# Patient Record
Sex: MALE | Race: WHITE | HISPANIC OR LATINO | Employment: OTHER | URBAN - METROPOLITAN AREA
[De-identification: names, ages, dates, MRNs, and addresses within clinical notes are randomized per-mention and may not be internally consistent; named-entity substitution may affect disease eponyms.]

---

## 2017-02-20 ENCOUNTER — HOSPITAL ENCOUNTER (EMERGENCY)
Facility: HOSPITAL | Age: 32
Discharge: HOME | End: 2017-02-20
Attending: EMERGENCY MEDICINE

## 2017-02-20 VITALS
TEMPERATURE: 99 F | RESPIRATION RATE: 16 BRPM | BODY MASS INDEX: 23.54 KG/M2 | HEIGHT: 67 IN | WEIGHT: 150 LBS | HEART RATE: 101 BPM | DIASTOLIC BLOOD PRESSURE: 78 MMHG | SYSTOLIC BLOOD PRESSURE: 122 MMHG | OXYGEN SATURATION: 98 %

## 2017-02-20 DIAGNOSIS — F41.9 ANXIETY: ICD-10-CM

## 2017-02-20 DIAGNOSIS — R07.89 ATYPICAL CHEST PAIN: ICD-10-CM

## 2017-02-20 DIAGNOSIS — F10.929 ALCOHOL INTOXICATION, WITH UNSPECIFIED COMPLICATION: Primary | ICD-10-CM

## 2017-02-20 PROCEDURE — 93005 ELECTROCARDIOGRAM TRACING: CPT

## 2017-02-20 PROCEDURE — 93010 ELECTROCARDIOGRAM REPORT: CPT | Mod: ,,, | Performed by: INTERNAL MEDICINE

## 2017-02-20 PROCEDURE — 99284 EMERGENCY DEPT VISIT MOD MDM: CPT

## 2017-02-20 NOTE — ED AVS SNAPSHOT
[facility]  [address]  [place] [address]               Babatunde Schmidt   2017  8:51 PM   ED    Descripción:  Male : 1985   Departamento:  [facility]           Hough Cuidado fue coordinado por:     Provider Role From To    Adrian Mehta MD Attending Provider 17 --      Razón de la mayo     Altered Mental Status           Diagnósticos de Esta Visita        Comentarios    Alcohol intoxication, with unspecified complication    -  Primario     Atypical chest pain         Anxiety           ED Disposition     Ninguna           Lista de tareas           Información de seguimiento     Realice un seguimiento con:  [facility] - FAMILY MEDICINE    Cómo:  Nile alcides mayo lo antes posible    Especialidad:  Family Medicine    Por qué:  when you get out of intermediate.     Información de contacto:    [address]  [place] [address]  [phone]        [facility] [place]     [facility] -  Assistance  Registered nurses in the [facility] provide clinical advisement, health education, appointment booking, and other advisory services.  Call for this free service at [phone].             Medicamentos           Mensaje sobre Medicamentos     Verify the changes and/or additions to your medication regime listed below are the same as discussed with your clinician today.  If any of these changes or additions are incorrect, please notify your healthcare provider.             Verifique que la siguiente lista de medicamentos es alcides representación exacta de los medicamentos que está tomando actualmente. Si no hay ningunos reportados, la lista puede estar en gomes. Si no es correcta, por favor póngase en contacto con hough proveedor de atención médica. Lleve esta lista con usted en guanakito de emergencia.                Información de referencia clínica           Ellie signos vitales rené     PS Pulso  "Temperatura Resp Center Peso    131/66 (BP Location: Left arm, Patient Position: Lying, BP Method: Automatic) 102 98.8 °F (37.1 °C) 20 5' 7" (1.702 m) 68 kg (150 lb)    SpO2 BMI (IMC)                96% 23.49 kg/m2          Alergias     A partir del:  2/20/2017        No Known Allergies      Vacunas     Administradas en la fecha de la visita:  2/20/2017        None      ED Micro, Lab, POCT     None      ED Imaging Orders     None      Referencias/Adjuntos de sagar     ALCOHOL ABUSE (Dutch)    CHEST PAIN, UNCERTAIN CAUSE (Dutch)    ANXIETY REACTION (Dutch)      Registrarse para [facility]     Activating your [facility] account is as easy as 1-2-3!     1) Visit [email or web address], select Sign Up Now, enter this activation code and your date of birth, then select Next.  [email or web address]  Expires: 4/6/2017  9:15 PM      2) Create a username and password to use when you visit [facility] in the future and select a security question in case you lose your password and select Next.    3) Enter your e-mail address and click Sign Up!    Additional Information  If you have questions, please e-mail [email or web address] or call [phone] to talk to our [facility] staff. Remember, [facility] is NOT to be used for urgent needs. For medical emergencies, dial 911.         Smoking Cessation     If you would like to quit smoking:   You may be eligible for free services if you are a [place] resident and started smoking cigarettes before September 1, 1988.  Call the Smoking Cessation Trust (SCT) toll free at (241) 804-8712 or (082) 045-7488.   Call 1-935-QUIT-NOW if you do not meet the above criteria.             [facility] complies with applicable Federal civil rights laws and does not discriminate on the basis of race, color, national origin, age, disability, or sex.        Language Assistance Services     ATTENTION: Language assistance services are available, free of charge. Please call [phone].  "     ATENCIÓN: Si habla grupo, tiene a conley disposición servicios gratuitos de asistencia lingüística. Kevin garcia [phone].     [facility] Ý: N?u b?n nói Ti?ng Vi?t, có các d?ch v? h? tr? ngôn ng? mi?n phí dành cho b?n. G?i s? [phone].                    [facility]  [address] [place] [address]               Babatunde Schmidt   2017  8:51 PM   ED    Description:  Male : 1985   Department:  [facility]           Your Care was Coordinated By:     Provider Role From To    Adrian Mehta MD Attending Provider 17 --      Reason for Visit     Altered Mental Status           Diagnoses this Visit        Comments    Alcohol intoxication, with unspecified complication    -  Primary     Atypical chest pain         Anxiety           ED Disposition     None           To Do List           Follow-up Information     Schedule an appointment as soon as possible for a visit with [facility] - FAMILY MEDICINE.    Specialty:  Family Medicine    Why:  when you get out of snf.     Contact information:    [address] [id number] [phone]        [facility]     [facility] -  Assistance  Registered nurses in the [facility] provide clinical advisement, health education, appointment booking, and other advisory services.  Call for this free service at [phone].             Medications           Message regarding Medications     Verify the changes and/or additions to your medication regime listed below are the same as discussed with your clinician today.  If any of these changes or additions are incorrect, please notify your healthcare provider.             Verify that the below list of medications is an accurate representation of the medications you are currently taking.  If none reported, the list may be blank. If incorrect, please contact your healthcare provider. Carry this  "list with you in case of emergency.                Clinical Reference Information           Your Vitals Were     BP Pulse Temp Resp Height Weight    131/66 (BP Location: Left arm, Patient Position: Lying, BP Method: Automatic) 102 98.8 °F (37.1 °C) 20 5' 7" (1.702 m) 68 kg (150 lb)    SpO2 BMI                96% 23.49 kg/m2          Allergies as of 2/20/2017     No Known Allergies      Immunizations Administered on Date of Encounter - 2/20/2017     None      ED Micro, Lab, POCT     None      ED Imaging Orders     None      Discharge References/Attachments     ALCOHOL ABUSE (Japanese)    CHEST PAIN, UNCERTAIN CAUSE (Japanese)    ANXIETY REACTION (Japanese)      [facility] Sign-Up     Activating your [facility] account is as easy as 1-2-3!     1) Visit [email or web address], select Sign Up Now, enter this activation code and your date of birth, then select Next.  [email or web address]  Expires: 4/6/2017  9:15 PM      2) Create a username and password to use when you visit [facility] in the future and select a security question in case you lose your password and select Next.    3) Enter your e-mail address and click Sign Up!    Additional Information  If you have questions, please e-mail [email or web address] or call [phone] to talk to our [facility] staff. Remember, [facility] is NOT to be used for urgent needs. For medical emergencies, dial 911.         Smoking Cessation     If you would like to quit smoking:   You may be eligible for free services if you are a [place] resident and started smoking cigarettes before September 1, 1988.  Call the Smoking Cessation Trust (SCT) toll free at (660) 110-2812 or (242) 538-7800.   Call 0-431-QUIT-NOW if you do not meet the above criteria.             [facility] complies with applicable Federal civil rights laws and does not discriminate on the basis of race, color, national origin, age, disability, or sex.        Language Assistance Services     ATTENTION: Language " assistance services are available, free of charge. Please call [phone].      ATENCIÓN: Si habla español, tiene a conley disposición servicios gratuitos de asistencia lingüística. Llame al [phone].     CHÚ Ý: N?u b?n nói Ti?ng Vi?t, có các d?ch v? h? tr? ngôn ng? mi?n phí dành cho b?n. G?i s? [phone].

## 2017-02-21 NOTE — ED TRIAGE NOTES
Pt. Arrives via ems from [facility] for eval of altered mental status. The pt. Is Lao speaking and assessment and information translated via . It is reported that the pt. Was arrested for trying to break into a home. Once in custody he began to have bizarre behavior and began screaming and clutching his chest and chewing on his fingers. On arrival pt. Is in custody, he is calm and cooperative. He answers simple questions appropriately. He smells strongly of etoh.

## 2017-02-21 NOTE — ED PROVIDER NOTES
Encounter Date: 2/20/2017       History     Chief Complaint   Patient presents with    Altered Mental Status     pt. sent from [facility] for eval of ams.. pt. was arrested for trying to break into a home. at retirement he began to act bizarre trying to chew on his fingers, clutching his chest. the pt. strong odor of etoh on breath.  pt. is in custody on arrival.      Review of patient's allergies indicates:  No Known Allergies  HPI Comments: Patient is a 31-year-old male who presents to the emergency room via police for evaluation of altered mental status after he was arrested try to break into a home.  Patient began complaining of chest discomfort and chewing on his fingers at retirement.  He has a strong smell of alcohol on his breath but denies any drug use.  Currently he has no complaints and feels much better.  He denies any history of psychiatric disease.  The  here with him is interpreting.  He has no current complaints of headache chest pain back pain shortness breath abdominal pain nausea vomiting diarrhea or any other complaints    History reviewed. No pertinent past medical history.  No past medical history pertinent negatives.  No past surgical history on file.  No family history on file.  Social History   Substance Use Topics    Smoking status: None    Smokeless tobacco: None    Alcohol use None     Review of Systems   Constitutional: Negative for fever.   HENT: Negative for ear pain, rhinorrhea and sore throat.    Eyes: Negative for pain and visual disturbance.   Respiratory: Negative for cough and shortness of breath.    Cardiovascular: Positive for chest pain.   Gastrointestinal: Negative for abdominal pain, diarrhea, nausea and vomiting.   Genitourinary: Negative for difficulty urinating.   Musculoskeletal: Negative for arthralgias and back pain.   Skin: Negative for color change and rash.   Neurological: Negative for weakness, numbness and headaches.   Psychiatric/Behavioral: Negative  for agitation, confusion and suicidal ideas. The patient is nervous/anxious.    All other systems reviewed and are negative.      Physical Exam   Initial Vitals   BP Pulse Resp Temp SpO2   02/20/17 2021 02/20/17 2021 02/20/17 2021 02/20/17 2021 02/20/17 2021   131/66 102 20 98.8 °F (37.1 °C) 96 %     Physical Exam    Nursing note and vitals reviewed.  Constitutional: He appears well-developed and well-nourished. No distress.   HENT:   Head: Normocephalic and atraumatic.   Mouth/Throat: Oropharynx is clear and moist.   Eyes: EOM are normal. Pupils are equal, round, and reactive to light.   Neck: Neck supple.   Cardiovascular: Normal rate, regular rhythm, normal heart sounds and intact distal pulses. Exam reveals no gallop and no friction rub.    No murmur heard.  Pulmonary/Chest: Breath sounds normal. He has no wheezes. He has no rhonchi. He has no rales. He exhibits no tenderness.   Abdominal: Soft. Bowel sounds are normal. There is no tenderness.   Musculoskeletal: Normal range of motion. He exhibits no edema.   Neurological: He is alert and oriented to person, place, and time. He has normal strength. No sensory deficit.   Skin: Skin is warm and dry.   Psychiatric: He has a normal mood and affect. Thought content normal.   Patient denies being homicidal or suicidal.  He does smell of EtOH.he denies being paranoid or delusional         ED Course   Procedures  Labs Reviewed - No data to display          Medical Decision Making:   I suspect the patient had an anxiety reaction after being arrested and he was drunk.  I see no signs of acute decompensation of psychiatric disease and no history of psychiatric disease.  His EKG was normal sinus rhythm with mild left atrial enlargement but no signs of acute ischemia.  I believe this was an anxiety reaction potentially malingering and the patient has no signs of acute coronary syndrome, pulmonary embolism,  pneumothorax, abnormal lung signs and can be stable to discharged  back in the care of police.  He is able to tolerate by mouth does not appear to be significantly altered from alcohol and doesn't need to be observed.                   ED Course     Clinical Impression:   The primary encounter diagnosis was Alcohol intoxication, with unspecified complication. Diagnoses of Atypical chest pain and Anxiety were also pertinent to this visit.          Adrian Mehta MD  02/20/17 8232

## 2017-08-08 ENCOUNTER — HOSPITAL ENCOUNTER (EMERGENCY)
Facility: HOSPITAL | Age: 32
Discharge: HOME | End: 2017-08-09
Attending: EMERGENCY MEDICINE

## 2017-08-08 VITALS
HEIGHT: 70 IN | BODY MASS INDEX: 22.9 KG/M2 | SYSTOLIC BLOOD PRESSURE: 155 MMHG | TEMPERATURE: 99 F | HEART RATE: 63 BPM | WEIGHT: 160 LBS | RESPIRATION RATE: 20 BRPM | OXYGEN SATURATION: 100 % | DIASTOLIC BLOOD PRESSURE: 86 MMHG

## 2017-08-08 DIAGNOSIS — T22.291A PARTIAL THICKNESS BURN OF MULTIPLE SITES OF RIGHT UPPER EXTREMITY, INITIAL ENCOUNTER: ICD-10-CM

## 2017-08-08 DIAGNOSIS — T30.0 BURN: ICD-10-CM

## 2017-08-08 DIAGNOSIS — L03.90 CELLULITIS, UNSPECIFIED CELLULITIS SITE: Primary | ICD-10-CM

## 2017-08-08 LAB
ALBUMIN SERPL BCP-MCNC: 3.9 G/DL
ALP SERPL-CCNC: 80 U/L
ALT SERPL W/O P-5'-P-CCNC: 26 U/L
ANION GAP SERPL CALC-SCNC: 13 MMOL/L
AST SERPL-CCNC: 17 U/L
BASOPHILS # BLD AUTO: 0.02 K/UL
BASOPHILS NFR BLD: 0.2 %
BILIRUB SERPL-MCNC: 1.1 MG/DL
BUN SERPL-MCNC: 12 MG/DL
CALCIUM SERPL-MCNC: 8.9 MG/DL
CHLORIDE SERPL-SCNC: 99 MMOL/L
CO2 SERPL-SCNC: 23 MMOL/L
CREAT SERPL-MCNC: 0.9 MG/DL
CRP SERPL-MCNC: 40.8 MG/L
DIFFERENTIAL METHOD: ABNORMAL
EOSINOPHIL # BLD AUTO: 0.2 K/UL
EOSINOPHIL NFR BLD: 1.8 %
ERYTHROCYTE [DISTWIDTH] IN BLOOD BY AUTOMATED COUNT: 13.4 %
EST. GFR  (AFRICAN AMERICAN): >60 ML/MIN/1.73 M^2
EST. GFR  (NON AFRICAN AMERICAN): >60 ML/MIN/1.73 M^2
GLUCOSE SERPL-MCNC: 111 MG/DL
HCT VFR BLD AUTO: 46 %
HGB BLD-MCNC: 15.1 G/DL
LYMPHOCYTES # BLD AUTO: 1.7 K/UL
LYMPHOCYTES NFR BLD: 15.4 %
MCH RBC QN AUTO: 30.4 PG
MCHC RBC AUTO-ENTMCNC: 32.8 G/DL
MCV RBC AUTO: 93 FL
MONOCYTES # BLD AUTO: 1.3 K/UL
MONOCYTES NFR BLD: 11.7 %
NEUTROPHILS # BLD AUTO: 8 K/UL
NEUTROPHILS NFR BLD: 70.9 %
PLATELET # BLD AUTO: 205 K/UL
PMV BLD AUTO: 10.4 FL
POTASSIUM SERPL-SCNC: 3.8 MMOL/L
PROT SERPL-MCNC: 7.6 G/DL
RBC # BLD AUTO: 4.97 M/UL
SODIUM SERPL-SCNC: 135 MMOL/L
WBC # BLD AUTO: 11.24 K/UL

## 2017-08-08 PROCEDURE — 25000003 PHARM REV CODE 250: Performed by: NURSE PRACTITIONER

## 2017-08-08 PROCEDURE — 96375 TX/PRO/DX INJ NEW DRUG ADDON: CPT

## 2017-08-08 PROCEDURE — 80053 COMPREHEN METABOLIC PANEL: CPT

## 2017-08-08 PROCEDURE — 96365 THER/PROPH/DIAG IV INF INIT: CPT

## 2017-08-08 PROCEDURE — 90715 TDAP VACCINE 7 YRS/> IM: CPT | Performed by: NURSE PRACTITIONER

## 2017-08-08 PROCEDURE — 85025 COMPLETE CBC W/AUTO DIFF WBC: CPT

## 2017-08-08 PROCEDURE — 96361 HYDRATE IV INFUSION ADD-ON: CPT

## 2017-08-08 PROCEDURE — 90471 IMMUNIZATION ADMIN: CPT | Performed by: NURSE PRACTITIONER

## 2017-08-08 PROCEDURE — 87040 BLOOD CULTURE FOR BACTERIA: CPT | Mod: 59

## 2017-08-08 PROCEDURE — 86140 C-REACTIVE PROTEIN: CPT

## 2017-08-08 PROCEDURE — 99284 EMERGENCY DEPT VISIT MOD MDM: CPT | Mod: 25

## 2017-08-08 PROCEDURE — 63600175 PHARM REV CODE 636 W HCPCS: Performed by: NURSE PRACTITIONER

## 2017-08-08 PROCEDURE — 85652 RBC SED RATE AUTOMATED: CPT

## 2017-08-08 RX ORDER — SILVER SULFADIAZINE 10 G/1000G
CREAM TOPICAL 2 TIMES DAILY
Qty: 50 G | Refills: 0 | Status: SHIPPED | OUTPATIENT
Start: 2017-08-08

## 2017-08-08 RX ORDER — ONDANSETRON 2 MG/ML
4 INJECTION INTRAMUSCULAR; INTRAVENOUS
Status: COMPLETED | OUTPATIENT
Start: 2017-08-08 | End: 2017-08-08

## 2017-08-08 RX ORDER — OXYCODONE AND ACETAMINOPHEN 5; 325 MG/1; MG/1
1 TABLET ORAL EVERY 6 HOURS PRN
Qty: 12 TABLET | Refills: 0 | Status: SHIPPED | OUTPATIENT
Start: 2017-08-08

## 2017-08-08 RX ORDER — DOXYCYCLINE 100 MG/1
100 CAPSULE ORAL EVERY 12 HOURS
Qty: 20 CAPSULE | Refills: 0 | Status: SHIPPED | OUTPATIENT
Start: 2017-08-08 | End: 2017-08-18

## 2017-08-08 RX ORDER — SILVER SULFADIAZINE 10 G/1000G
1 CREAM TOPICAL
Status: COMPLETED | OUTPATIENT
Start: 2017-08-08 | End: 2017-08-08

## 2017-08-08 RX ORDER — MORPHINE SULFATE 2 MG/ML
4 INJECTION, SOLUTION INTRAMUSCULAR; INTRAVENOUS
Status: COMPLETED | OUTPATIENT
Start: 2017-08-08 | End: 2017-08-08

## 2017-08-08 RX ADMIN — SODIUM CHLORIDE 1000 ML: 0.9 INJECTION, SOLUTION INTRAVENOUS at 09:08

## 2017-08-08 RX ADMIN — MORPHINE SULFATE 4 MG: 2 INJECTION, SOLUTION INTRAMUSCULAR; INTRAVENOUS at 09:08

## 2017-08-08 RX ADMIN — SILVER SULFADIAZINE 1 TUBE: 10 CREAM TOPICAL at 10:08

## 2017-08-08 RX ADMIN — VANCOMYCIN HYDROCHLORIDE 1000 MG: 1 INJECTION, POWDER, LYOPHILIZED, FOR SOLUTION INTRAVENOUS at 09:08

## 2017-08-08 RX ADMIN — ONDANSETRON 4 MG: 2 INJECTION INTRAMUSCULAR; INTRAVENOUS at 09:08

## 2017-08-08 RX ADMIN — CLOSTRIDIUM TETANI TOXOID ANTIGEN (FORMALDEHYDE INACTIVATED), CORYNEBACTERIUM DIPHTHERIAE TOXOID ANTIGEN (FORMALDEHYDE INACTIVATED), BORDETELLA PERTUSSIS TOXOID ANTIGEN (GLUTARALDEHYDE INACTIVATED), BORDETELLA PERTUSSIS FILAMENTOUS HEMAGGLUTININ ANTIGEN (FORMALDEHYDE INACTIVATED), BORDETELLA PERTUSSIS PERTACTIN ANTIGEN, AND BORDETELLA PERTUSSIS FIMBRIAE 2/3 ANTIGEN 0.5 ML: 5; 2; 2.5; 5; 3; 5 INJECTION, SUSPENSION INTRAMUSCULAR at 09:08

## 2017-08-09 LAB — ERYTHROCYTE [SEDIMENTATION RATE] IN BLOOD BY WESTERGREN METHOD: 9 MM/HR
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
## 2017-08-09 NOTE — ED NOTES
Pt. unable to contact family member at present time. Pt. still attempting to contact friends/family for transport home. Will check back with patient in 20 minutes.

## 2017-08-09 NOTE — ED NOTES
Family/friends are not answering the phone. Pt. will continue to reach out to others for transport home. No money for a taxi.

## 2017-08-09 NOTE — ED NOTES
Pt. Discharge disposition, discharge paperwork and mediation done by Carla MORRIS.  present at bedside.

## 2017-08-09 NOTE — ED NOTES
Wound care completed--cleasned with sterile saline, patted dry, silver sulfadiazine applied, covered with vaseline gauze and loosely wrapped with kerlix and secured with surgical tape.

## 2017-08-14 LAB
BACTERIA BLD CULT: NORMAL
BACTERIA BLD CULT: NORMAL